# Patient Record
Sex: MALE | Race: BLACK OR AFRICAN AMERICAN | NOT HISPANIC OR LATINO | Employment: FULL TIME | URBAN - METROPOLITAN AREA
[De-identification: names, ages, dates, MRNs, and addresses within clinical notes are randomized per-mention and may not be internally consistent; named-entity substitution may affect disease eponyms.]

---

## 2017-08-15 ENCOUNTER — HOSPITAL ENCOUNTER (EMERGENCY)
Facility: HOSPITAL | Age: 37
Discharge: HOME | End: 2017-08-15
Attending: EMERGENCY MEDICINE
Payer: MEDICAID

## 2017-08-15 VITALS
RESPIRATION RATE: 15 BRPM | SYSTOLIC BLOOD PRESSURE: 130 MMHG | OXYGEN SATURATION: 99 % | BODY MASS INDEX: 23.32 KG/M2 | WEIGHT: 140 LBS | HEART RATE: 90 BPM | HEIGHT: 65 IN | DIASTOLIC BLOOD PRESSURE: 79 MMHG | TEMPERATURE: 98 F

## 2017-08-15 DIAGNOSIS — S39.012A LOW BACK STRAIN, INITIAL ENCOUNTER: Primary | ICD-10-CM

## 2017-08-15 DIAGNOSIS — Z18.9 RETAINED FOREIGN BODY: ICD-10-CM

## 2017-08-15 PROCEDURE — 99283 EMERGENCY DEPT VISIT LOW MDM: CPT

## 2017-08-15 PROCEDURE — 25000003 PHARM REV CODE 250: Performed by: EMERGENCY MEDICINE

## 2017-08-15 RX ORDER — CYCLOBENZAPRINE HCL 10 MG
10 TABLET ORAL 3 TIMES DAILY PRN
Qty: 15 TABLET | Refills: 0 | Status: SHIPPED | OUTPATIENT
Start: 2017-08-15 | End: 2017-08-20

## 2017-08-15 RX ORDER — KETOROLAC TROMETHAMINE 10 MG/1
10 TABLET, FILM COATED ORAL EVERY 8 HOURS
Qty: 10 TABLET | Refills: 0 | Status: SHIPPED | OUTPATIENT
Start: 2017-08-15 | End: 2017-08-15 | Stop reason: CLARIF

## 2017-08-15 RX ORDER — KETOROLAC TROMETHAMINE 10 MG/1
10 TABLET, FILM COATED ORAL
Status: COMPLETED | OUTPATIENT
Start: 2017-08-15 | End: 2017-08-15

## 2017-08-15 RX ORDER — LISINOPRIL 10 MG/1
10 TABLET ORAL DAILY
COMMUNITY

## 2017-08-15 RX ADMIN — KETOROLAC TROMETHAMINE 10 MG: 10 TABLET, FILM COATED ORAL at 07:08
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
## 2019-01-18 ENCOUNTER — HOSPITAL ENCOUNTER (EMERGENCY)
Facility: HOSPITAL | Age: 39
Discharge: HOME | End: 2019-01-18
Attending: EMERGENCY MEDICINE
Payer: MEDICAID

## 2019-01-18 VITALS
WEIGHT: 142 LBS | DIASTOLIC BLOOD PRESSURE: 95 MMHG | SYSTOLIC BLOOD PRESSURE: 160 MMHG | OXYGEN SATURATION: 95 % | RESPIRATION RATE: 16 BRPM | HEART RATE: 95 BPM | BODY MASS INDEX: 24.24 KG/M2 | TEMPERATURE: 98 F | HEIGHT: 64 IN

## 2019-01-18 DIAGNOSIS — Z86.59 HISTORY OF POSTTRAUMATIC STRESS DISORDER (PTSD): Primary | ICD-10-CM

## 2019-01-18 PROCEDURE — 99281 EMR DPT VST MAYX REQ PHY/QHP: CPT

## 2019-01-18 PROCEDURE — 25000003 PHARM REV CODE 250: Performed by: EMERGENCY MEDICINE

## 2019-01-18 RX ORDER — HYDROXYZINE PAMOATE 25 MG/1
25 CAPSULE ORAL
Status: DISCONTINUED | OUTPATIENT
Start: 2019-01-18 | End: 2019-01-18 | Stop reason: HOSPADM

## 2019-01-18 NOTE — ED TRIAGE NOTES
"Pt arrived via EMS Pt  went to two other hospitals [facility] for chest pain, Pt then called ems for third time saying that his ptsd is acting up, Pt does state that "I feel like I need to be hospitalized." Pt denies HI and SI     "
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
## 2019-01-18 NOTE — DISCHARGE INSTRUCTIONS
Please follow up with your regular doctor.  Please return for any new or worsening symptoms such as thoughts of harming herself, thoughts of harming others.

## 2022-07-26 ENCOUNTER — HOSPITAL ENCOUNTER (EMERGENCY)
Facility: HOSPITAL | Age: 42
Discharge: HOME | End: 2022-07-26
Attending: EMERGENCY MEDICINE
Payer: MEDICARE

## 2022-07-26 VITALS
BODY MASS INDEX: 25.61 KG/M2 | WEIGHT: 150 LBS | HEART RATE: 84 BPM | HEIGHT: 64 IN | RESPIRATION RATE: 18 BRPM | TEMPERATURE: 99 F | OXYGEN SATURATION: 98 % | DIASTOLIC BLOOD PRESSURE: 88 MMHG | SYSTOLIC BLOOD PRESSURE: 130 MMHG

## 2022-07-26 DIAGNOSIS — G89.4 CHRONIC PAIN SYNDROME: Primary | ICD-10-CM

## 2022-07-26 PROCEDURE — 96372 THER/PROPH/DIAG INJ SC/IM: CPT | Performed by: STUDENT IN AN ORGANIZED HEALTH CARE EDUCATION/TRAINING PROGRAM

## 2022-07-26 PROCEDURE — 99284 EMERGENCY DEPT VISIT MOD MDM: CPT

## 2022-07-26 PROCEDURE — 25000003 PHARM REV CODE 250: Performed by: STUDENT IN AN ORGANIZED HEALTH CARE EDUCATION/TRAINING PROGRAM

## 2022-07-26 PROCEDURE — 63600175 PHARM REV CODE 636 W HCPCS: Performed by: STUDENT IN AN ORGANIZED HEALTH CARE EDUCATION/TRAINING PROGRAM

## 2022-07-26 RX ORDER — HYDROCODONE BITARTRATE AND ACETAMINOPHEN 5; 325 MG/1; MG/1
1 TABLET ORAL
Status: COMPLETED | OUTPATIENT
Start: 2022-07-26 | End: 2022-07-26

## 2022-07-26 RX ORDER — KETOROLAC TROMETHAMINE 30 MG/ML
30 INJECTION, SOLUTION INTRAMUSCULAR; INTRAVENOUS
Status: COMPLETED | OUTPATIENT
Start: 2022-07-26 | End: 2022-07-26

## 2022-07-26 RX ADMIN — HYDROCODONE BITARTRATE AND ACETAMINOPHEN 1 TABLET: 5; 325 TABLET ORAL at 05:07

## 2022-07-26 RX ADMIN — KETOROLAC TROMETHAMINE 30 MG: 30 INJECTION, SOLUTION INTRAMUSCULAR; INTRAVENOUS at 05:07

## 2022-07-26 NOTE — DISCHARGE INSTRUCTIONS
Please follow-up as scheduled with your chronic pain management provider.  Please read all discharge instructions. Follow all written and verbal discharge instructions. Return to the nearest emergency room for any new or worsening symptoms, non-resolution of your current symptoms or if you feel you need reevaluation. Continue all home medications as prescribed and start any new prescriptions given to you in the emergency room. Followup with your primary care and make them aware of your recent ER visit. Followup with any additional providers or specialists as discussed. Do not hesitate to contact the emergency department if you have questions about your diagnosis, discharge instructions, followup recommendations, or medications.
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
## 2022-08-10 ENCOUNTER — HOSPITAL ENCOUNTER (OUTPATIENT)
Dept: RADIOLOGY | Facility: HOSPITAL | Age: 42
Discharge: HOME | End: 2022-08-10
Attending: PHYSICIAN ASSISTANT
Payer: MEDICARE

## 2022-08-10 ENCOUNTER — OFFICE VISIT (OUTPATIENT)
Dept: NEUROSURGERY | Facility: CLINIC | Age: 42
End: 2022-08-10
Payer: MEDICARE

## 2022-08-10 VITALS
BODY MASS INDEX: 25.6 KG/M2 | DIASTOLIC BLOOD PRESSURE: 86 MMHG | WEIGHT: 149.94 LBS | HEIGHT: 64 IN | HEART RATE: 103 BPM | SYSTOLIC BLOOD PRESSURE: 124 MMHG | TEMPERATURE: 98 F

## 2022-08-10 DIAGNOSIS — M54.9 DORSALGIA OF MULTIPLE SITES IN SPINE: ICD-10-CM

## 2022-08-10 DIAGNOSIS — M47.812 CERVICAL SPONDYLOSIS: Primary | ICD-10-CM

## 2022-08-10 DIAGNOSIS — M54.9 DORSALGIA: ICD-10-CM

## 2022-08-10 DIAGNOSIS — M54.9 DORSALGIA, UNSPECIFIED: ICD-10-CM

## 2022-08-10 DIAGNOSIS — M54.16 LUMBAR RADICULOPATHY, CHRONIC: ICD-10-CM

## 2022-08-10 DIAGNOSIS — M47.816 LUMBAR SPONDYLOSIS: ICD-10-CM

## 2022-08-10 DIAGNOSIS — M54.6 PAIN IN THORACIC SPINE: ICD-10-CM

## 2022-08-10 PROCEDURE — 99204 PR OFFICE/OUTPT VISIT, NEW, LEVL IV, 45-59 MIN: ICD-10-PCS | Mod: S$GLB,,, | Performed by: PHYSICIAN ASSISTANT

## 2022-08-10 PROCEDURE — 72114 X-RAY EXAM L-S SPINE BENDING: CPT | Mod: TC

## 2022-08-10 PROCEDURE — 72070 X-RAY EXAM THORAC SPINE 2VWS: CPT | Mod: 26,,, | Performed by: RADIOLOGY

## 2022-08-10 PROCEDURE — 3074F PR MOST RECENT SYSTOLIC BLOOD PRESSURE < 130 MM HG: ICD-10-PCS | Mod: CPTII,S$GLB,, | Performed by: PHYSICIAN ASSISTANT

## 2022-08-10 PROCEDURE — 72070 XR THORACIC SPINE AP LATERAL: ICD-10-PCS | Mod: 26,,, | Performed by: RADIOLOGY

## 2022-08-10 PROCEDURE — 3008F PR BODY MASS INDEX (BMI) DOCUMENTED: ICD-10-PCS | Mod: CPTII,S$GLB,, | Performed by: PHYSICIAN ASSISTANT

## 2022-08-10 PROCEDURE — 3074F SYST BP LT 130 MM HG: CPT | Mod: CPTII,S$GLB,, | Performed by: PHYSICIAN ASSISTANT

## 2022-08-10 PROCEDURE — 1159F PR MEDICATION LIST DOCUMENTED IN MEDICAL RECORD: ICD-10-PCS | Mod: CPTII,S$GLB,, | Performed by: PHYSICIAN ASSISTANT

## 2022-08-10 PROCEDURE — 99204 OFFICE O/P NEW MOD 45 MIN: CPT | Mod: S$GLB,,, | Performed by: PHYSICIAN ASSISTANT

## 2022-08-10 PROCEDURE — 3079F PR MOST RECENT DIASTOLIC BLOOD PRESSURE 80-89 MM HG: ICD-10-PCS | Mod: CPTII,S$GLB,, | Performed by: PHYSICIAN ASSISTANT

## 2022-08-10 PROCEDURE — 99999 PR PBB SHADOW E&M-EST. PATIENT-LVL V: ICD-10-PCS | Mod: PBBFAC,,, | Performed by: PHYSICIAN ASSISTANT

## 2022-08-10 PROCEDURE — 3008F BODY MASS INDEX DOCD: CPT | Mod: CPTII,S$GLB,, | Performed by: PHYSICIAN ASSISTANT

## 2022-08-10 PROCEDURE — 72114 X-RAY EXAM L-S SPINE BENDING: CPT | Mod: 26,,, | Performed by: RADIOLOGY

## 2022-08-10 PROCEDURE — 99999 PR PBB SHADOW E&M-EST. PATIENT-LVL V: CPT | Mod: PBBFAC,,, | Performed by: PHYSICIAN ASSISTANT

## 2022-08-10 PROCEDURE — 1159F MED LIST DOCD IN RCRD: CPT | Mod: CPTII,S$GLB,, | Performed by: PHYSICIAN ASSISTANT

## 2022-08-10 PROCEDURE — 3079F DIAST BP 80-89 MM HG: CPT | Mod: CPTII,S$GLB,, | Performed by: PHYSICIAN ASSISTANT

## 2022-08-10 PROCEDURE — 72070 X-RAY EXAM THORAC SPINE 2VWS: CPT | Mod: TC

## 2022-08-10 PROCEDURE — 72114 XR LUMBAR SPINE 5 VIEW WITH FLEX AND EXT: ICD-10-PCS | Mod: 26,,, | Performed by: RADIOLOGY

## 2022-08-10 RX ORDER — CYCLOBENZAPRINE HCL 10 MG
5-10 TABLET ORAL NIGHTLY
COMMUNITY
Start: 2022-05-24 | End: 2022-08-10

## 2022-08-10 RX ORDER — FAMOTIDINE 40 MG/1
40 TABLET, FILM COATED ORAL EVERY MORNING
COMMUNITY
Start: 2022-05-24

## 2022-08-10 RX ORDER — GABAPENTIN 300 MG/1
300 CAPSULE ORAL
COMMUNITY
End: 2022-08-10

## 2022-08-10 RX ORDER — TIZANIDINE 4 MG/1
TABLET ORAL
COMMUNITY
End: 2022-08-10

## 2022-08-10 RX ORDER — OXYCODONE AND ACETAMINOPHEN 10; 325 MG/1; MG/1
3 TABLET ORAL 3 TIMES DAILY PRN
COMMUNITY

## 2022-08-10 RX ORDER — ALBUTEROL SULFATE 90 UG/1
2 AEROSOL, METERED RESPIRATORY (INHALATION) EVERY 6 HOURS PRN
COMMUNITY
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
## 2022-08-22 DIAGNOSIS — M54.6 PAIN IN THORACIC SPINE: ICD-10-CM

## 2022-08-22 DIAGNOSIS — M47.816 LUMBAR SPONDYLOSIS: ICD-10-CM

## 2022-08-22 DIAGNOSIS — M47.812 CERVICAL SPONDYLOSIS: ICD-10-CM

## 2022-08-22 DIAGNOSIS — M54.16 LUMBAR RADICULOPATHY, CHRONIC: Primary | ICD-10-CM

## 2022-08-23 PROBLEM — M54.9: Status: ACTIVE | Noted: 2022-08-23

## 2022-08-24 ENCOUNTER — TELEPHONE (OUTPATIENT)
Dept: PAIN MEDICINE | Facility: CLINIC | Age: 42
End: 2022-08-24
Payer: MEDICARE

## 2022-08-24 NOTE — TELEPHONE ENCOUNTER
----- Message from Kaila Hickman sent at 8/24/2022  8:47 AM CDT -----  Regarding: CAll BAck  Name of Who is Calling:DANA BELTRÁN [[id number]]              What is the request in detail: Patient requesting a call back about Cancel appointment.08- Please assits              Can the clinic reply by [facility]: No              [facility]: [phone]

## 2022-08-24 NOTE — TELEPHONE ENCOUNTER
Staff spoke with patient regarding NP appt today, patients appt was canceled due to requesting multiple medications that we do not prescribe. Patient was referred to [facility] who should be able to assist further. Patient verbalized understanding.

## 2022-08-29 ENCOUNTER — TELEPHONE (OUTPATIENT)
Dept: NEUROSURGERY | Facility: CLINIC | Age: 42
End: 2022-08-29
Payer: MEDICARE

## 2022-08-29 NOTE — TELEPHONE ENCOUNTER
----- Message from Hali Benito PA-C sent at 8/10/2022  4:19 PM CDT -----  Can you schedule him for CT myelogram of the cervical, thoracic, and lumbar spine? I also sent a referral to pain management. We can follow-up with him after the myelogram is done.     Thanks!

## 2022-08-29 NOTE — TELEPHONE ENCOUNTER
Tania Rowland, RT  Shannan Richmond MA  Good afternoon-   This patient is refusing to schedule his myelogram.   Thought you should know.   Tania

## 2022-11-15 ENCOUNTER — HOSPITAL ENCOUNTER (EMERGENCY)
Facility: HOSPITAL | Age: 42
Discharge: LEFT BEFORE DISCHARGE | End: 2022-11-16
Attending: EMERGENCY MEDICINE
Payer: MEDICARE

## 2022-11-15 VITALS
BODY MASS INDEX: 25.61 KG/M2 | OXYGEN SATURATION: 98 % | HEART RATE: 76 BPM | DIASTOLIC BLOOD PRESSURE: 88 MMHG | TEMPERATURE: 98 F | SYSTOLIC BLOOD PRESSURE: 130 MMHG | WEIGHT: 150 LBS | HEIGHT: 64 IN | RESPIRATION RATE: 18 BRPM

## 2022-11-15 DIAGNOSIS — Z53.21 PATIENT LEFT WITHOUT BEING SEEN: Primary | ICD-10-CM

## 2022-11-15 LAB
INFLUENZA A, MOLECULAR: NEGATIVE
INFLUENZA B, MOLECULAR: NEGATIVE
SARS-COV-2 RDRP RESP QL NAA+PROBE: NEGATIVE
SPECIMEN SOURCE: NORMAL

## 2022-11-15 PROCEDURE — 99999 HC NO LEVEL OF SERVICE - ED ONLY: CPT

## 2022-11-15 PROCEDURE — U0002 COVID-19 LAB TEST NON-CDC: HCPCS | Performed by: NURSE PRACTITIONER

## 2022-11-15 PROCEDURE — 87502 INFLUENZA DNA AMP PROBE: CPT | Performed by: NURSE PRACTITIONER
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
## 2022-11-23 ENCOUNTER — OFFICE VISIT (OUTPATIENT)
Dept: UROLOGY | Facility: CLINIC | Age: 42
End: 2022-11-23
Payer: MEDICARE

## 2022-11-23 VITALS
BODY MASS INDEX: 26.14 KG/M2 | SYSTOLIC BLOOD PRESSURE: 146 MMHG | HEART RATE: 86 BPM | WEIGHT: 152.31 LBS | DIASTOLIC BLOOD PRESSURE: 91 MMHG

## 2022-11-23 DIAGNOSIS — Z87.448 H/O URETHRAL STRICTURE: Primary | ICD-10-CM

## 2022-11-23 LAB
BILIRUB SERPL-MCNC: NORMAL MG/DL
BLOOD URINE, POC: NORMAL
CLARITY, POC UA: CLEAR
COLOR, POC UA: YELLOW
GLUCOSE UR QL STRIP: NORMAL
KETONES UR QL STRIP: NORMAL
LEUKOCYTE ESTERASE URINE, POC: NORMAL
NITRITE, POC UA: NORMAL
PH, POC UA: 5
POC RESIDUAL URINE VOLUME: 0 ML (ref 0–100)
PROTEIN, POC: NORMAL
SPECIFIC GRAVITY, POC UA: 1.02
UROBILINOGEN, POC UA: NORMAL

## 2022-11-23 PROCEDURE — 81002 URINALYSIS NONAUTO W/O SCOPE: CPT | Mod: S$GLB,,,

## 2022-11-23 PROCEDURE — 81002 POCT URINE DIPSTICK WITHOUT MICROSCOPE: ICD-10-PCS | Mod: S$GLB,,,

## 2022-11-23 PROCEDURE — 3077F SYST BP >= 140 MM HG: CPT | Mod: CPTII,S$GLB,,

## 2022-11-23 PROCEDURE — 1159F PR MEDICATION LIST DOCUMENTED IN MEDICAL RECORD: ICD-10-PCS | Mod: CPTII,S$GLB,,

## 2022-11-23 PROCEDURE — 51798 US URINE CAPACITY MEASURE: CPT | Mod: S$GLB,,,

## 2022-11-23 PROCEDURE — 99999 PR PBB SHADOW E&M-EST. PATIENT-LVL III: CPT | Mod: PBBFAC,,,

## 2022-11-23 PROCEDURE — 3080F DIAST BP >= 90 MM HG: CPT | Mod: CPTII,S$GLB,,

## 2022-11-23 PROCEDURE — 99214 PR OFFICE/OUTPT VISIT, EST, LEVL IV, 30-39 MIN: ICD-10-PCS | Mod: S$GLB,,,

## 2022-11-23 PROCEDURE — 1160F PR REVIEW ALL MEDS BY PRESCRIBER/CLIN PHARMACIST DOCUMENTED: ICD-10-PCS | Mod: CPTII,S$GLB,,

## 2022-11-23 PROCEDURE — 1160F RVW MEDS BY RX/DR IN RCRD: CPT | Mod: CPTII,S$GLB,,

## 2022-11-23 PROCEDURE — 1159F MED LIST DOCD IN RCRD: CPT | Mod: CPTII,S$GLB,,

## 2022-11-23 PROCEDURE — 51798 POCT BLADDER SCAN: ICD-10-PCS | Mod: S$GLB,,,

## 2022-11-23 PROCEDURE — 3077F PR MOST RECENT SYSTOLIC BLOOD PRESSURE >= 140 MM HG: ICD-10-PCS | Mod: CPTII,S$GLB,,

## 2022-11-23 PROCEDURE — 99999 PR PBB SHADOW E&M-EST. PATIENT-LVL III: ICD-10-PCS | Mod: PBBFAC,,,

## 2022-11-23 PROCEDURE — 3008F BODY MASS INDEX DOCD: CPT | Mod: CPTII,S$GLB,,

## 2022-11-23 PROCEDURE — 99214 OFFICE O/P EST MOD 30 MIN: CPT | Mod: S$GLB,,,

## 2022-11-23 PROCEDURE — 3008F PR BODY MASS INDEX (BMI) DOCUMENTED: ICD-10-PCS | Mod: CPTII,S$GLB,,

## 2022-11-23 PROCEDURE — 3080F PR MOST RECENT DIASTOLIC BLOOD PRESSURE >= 90 MM HG: ICD-10-PCS | Mod: CPTII,S$GLB,,
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
## 2024-02-15 ENCOUNTER — HOSPITAL ENCOUNTER (EMERGENCY)
Facility: HOSPITAL | Age: 44
Discharge: HOME | End: 2024-02-15
Attending: EMERGENCY MEDICINE
Payer: MEDICARE

## 2024-02-15 VITALS
BODY MASS INDEX: 24.16 KG/M2 | OXYGEN SATURATION: 99 % | HEART RATE: 89 BPM | SYSTOLIC BLOOD PRESSURE: 139 MMHG | TEMPERATURE: 98 F | RESPIRATION RATE: 18 BRPM | WEIGHT: 145 LBS | DIASTOLIC BLOOD PRESSURE: 78 MMHG | HEIGHT: 65 IN

## 2024-02-15 DIAGNOSIS — G89.29 OTHER CHRONIC PAIN: ICD-10-CM

## 2024-02-15 DIAGNOSIS — R11.0 NAUSEA: ICD-10-CM

## 2024-02-15 DIAGNOSIS — R07.81 RIB PAIN ON LEFT SIDE: ICD-10-CM

## 2024-02-15 DIAGNOSIS — R10.9 LEFT FLANK PAIN: Primary | ICD-10-CM

## 2024-02-15 PROBLEM — G89.21 CHRONIC PAIN AFTER TRAUMATIC INJURY: Status: ACTIVE | Noted: 2022-09-20

## 2024-02-15 PROBLEM — J45.909 UNCOMPLICATED ASTHMA: Status: ACTIVE | Noted: 2019-01-03

## 2024-02-15 PROBLEM — M51.16 RADICULOPATHY DUE TO LUMBAR INTERVERTEBRAL DISC DISORDER: Status: ACTIVE | Noted: 2023-08-10

## 2024-02-15 PROBLEM — M47.817 LUMBOSACRAL SPONDYLOSIS WITHOUT MYELOPATHY: Status: ACTIVE | Noted: 2022-09-20

## 2024-02-15 PROBLEM — F14.20 COCAINE USE DISORDER, MODERATE, DEPENDENCE: Status: ACTIVE | Noted: 2024-02-15

## 2024-02-15 PROBLEM — F31.9: Status: ACTIVE | Noted: 2022-08-01

## 2024-02-15 PROBLEM — M54.12 CERVICAL RADICULOPATHY: Status: ACTIVE | Noted: 2023-08-10

## 2024-02-15 PROBLEM — F43.10 PTSD (POST-TRAUMATIC STRESS DISORDER): Status: ACTIVE | Noted: 2024-02-15

## 2024-02-15 PROBLEM — N35.013: Status: ACTIVE | Noted: 2023-10-16

## 2024-02-15 PROBLEM — M47.812 CERVICAL SPONDYLOSIS WITHOUT MYELOPATHY: Status: ACTIVE | Noted: 2022-09-20

## 2024-02-15 PROBLEM — F32.A DEPRESSION: Status: ACTIVE | Noted: 2019-01-03

## 2024-02-15 PROBLEM — N17.9 AKI (ACUTE KIDNEY INJURY): Status: ACTIVE | Noted: 2024-02-15

## 2024-02-15 PROBLEM — R39.9 LOWER URINARY TRACT SYMPTOMS: Status: ACTIVE | Noted: 2023-10-16

## 2024-02-15 LAB
BACTERIA #/AREA URNS HPF: NORMAL /HPF
BILIRUB UR QL STRIP: NEGATIVE
CLARITY UR: CLEAR
COLOR UR: COLORLESS
GLUCOSE UR QL STRIP: NEGATIVE
HGB UR QL STRIP: NEGATIVE
KETONES UR QL STRIP: NEGATIVE
LEUKOCYTE ESTERASE UR QL STRIP: ABNORMAL
MICROSCOPIC COMMENT: NORMAL
NITRITE UR QL STRIP: NEGATIVE
PH UR STRIP: 7 [PH] (ref 5–8)
PROT UR QL STRIP: NEGATIVE
SP GR UR STRIP: 1.01 (ref 1–1.03)
URN SPEC COLLECT METH UR: ABNORMAL
UROBILINOGEN UR STRIP-ACNC: NEGATIVE EU/DL
WBC #/AREA URNS HPF: 2 /HPF (ref 0–5)

## 2024-02-15 PROCEDURE — 99284 EMERGENCY DEPT VISIT MOD MDM: CPT | Mod: 25

## 2024-02-15 PROCEDURE — 81000 URINALYSIS NONAUTO W/SCOPE: CPT | Performed by: NURSE PRACTITIONER

## 2024-02-15 PROCEDURE — 96372 THER/PROPH/DIAG INJ SC/IM: CPT | Performed by: NURSE PRACTITIONER

## 2024-02-15 PROCEDURE — 63600175 PHARM REV CODE 636 W HCPCS: Performed by: NURSE PRACTITIONER

## 2024-02-15 RX ORDER — HYDROXYZINE PAMOATE 50 MG/1
50 CAPSULE ORAL 3 TIMES DAILY PRN
COMMUNITY

## 2024-02-15 RX ORDER — ONDANSETRON 4 MG/1
4 TABLET, ORALLY DISINTEGRATING ORAL EVERY 6 HOURS PRN
Qty: 20 TABLET | Refills: 0 | Status: SHIPPED | OUTPATIENT
Start: 2024-02-15

## 2024-02-15 RX ORDER — OXYCODONE HYDROCHLORIDE 10 MG/1
10 TABLET ORAL
COMMUNITY

## 2024-02-15 RX ORDER — NAPROXEN 500 MG/1
500 TABLET ORAL EVERY 12 HOURS PRN
Qty: 20 TABLET | Refills: 0 | Status: SHIPPED | OUTPATIENT
Start: 2024-02-15

## 2024-02-15 RX ORDER — IBUPROFEN 600 MG/1
TABLET ORAL
COMMUNITY

## 2024-02-15 RX ORDER — HYDROCODONE BITARTRATE AND ACETAMINOPHEN 5; 325 MG/1; MG/1
TABLET ORAL
COMMUNITY

## 2024-02-15 RX ORDER — TIZANIDINE 2 MG/1
2 TABLET ORAL EVERY 8 HOURS PRN
Qty: 15 TABLET | Refills: 0 | Status: SHIPPED | OUTPATIENT
Start: 2024-02-15

## 2024-02-15 RX ORDER — KETOROLAC TROMETHAMINE 30 MG/ML
30 INJECTION, SOLUTION INTRAMUSCULAR; INTRAVENOUS
Status: COMPLETED | OUTPATIENT
Start: 2024-02-15 | End: 2024-02-15

## 2024-02-15 RX ADMIN — KETOROLAC TROMETHAMINE 30 MG: 30 INJECTION, SOLUTION INTRAMUSCULAR; INTRAVENOUS at 04:02
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
## 2024-02-15 NOTE — DISCHARGE INSTRUCTIONS

## 2024-02-15 NOTE — Clinical Note
"Carmelaun "Fer" Hasmukh was seen and treated in our emergency department on 2/15/2024.  He may return to work on 02/17/2024.       If you have any questions or concerns, please don't hesitate to call.      Kenny Marie, NP"

## 2024-05-20 PROBLEM — N17.9 AKI (ACUTE KIDNEY INJURY): Status: RESOLVED | Noted: 2024-02-15 | Resolved: 2024-05-20

## 2024-07-14 ENCOUNTER — OFFICE VISIT (OUTPATIENT)
Dept: URGENT CARE | Facility: CLINIC | Age: 44
End: 2024-07-14
Payer: MEDICARE

## 2024-07-14 VITALS
DIASTOLIC BLOOD PRESSURE: 80 MMHG | OXYGEN SATURATION: 99 % | HEIGHT: 65 IN | BODY MASS INDEX: 24.17 KG/M2 | HEART RATE: 81 BPM | TEMPERATURE: 98 F | SYSTOLIC BLOOD PRESSURE: 131 MMHG | WEIGHT: 145.06 LBS | RESPIRATION RATE: 17 BRPM

## 2024-07-14 DIAGNOSIS — S30.812A ABRASION OF PENIS WITH INFECTION, INITIAL ENCOUNTER: Primary | ICD-10-CM

## 2024-07-14 DIAGNOSIS — N48.29 ABRASION OF PENIS WITH INFECTION, INITIAL ENCOUNTER: Primary | ICD-10-CM

## 2024-07-14 DIAGNOSIS — S30.812A ABRASION OF PENIS, INITIAL ENCOUNTER: ICD-10-CM

## 2024-07-14 PROCEDURE — 99213 OFFICE O/P EST LOW 20 MIN: CPT | Mod: S$GLB,,, | Performed by: FAMILY MEDICINE

## 2024-07-14 RX ORDER — MUPIROCIN 20 MG/G
OINTMENT TOPICAL 2 TIMES DAILY
Qty: 22 G | Refills: 0 | Status: SHIPPED | OUTPATIENT
Start: 2024-07-14

## 2024-07-14 RX ORDER — CEPHALEXIN 500 MG/1
500 CAPSULE ORAL EVERY 8 HOURS
Qty: 21 CAPSULE | Refills: 0 | Status: SHIPPED | OUTPATIENT
Start: 2024-07-14 | End: 2024-07-21

## 2024-07-14 RX ORDER — LOSARTAN POTASSIUM 25 MG/1
25 TABLET ORAL
COMMUNITY

## 2024-07-14 NOTE — PATIENT INSTRUCTIONS
Use a topical and oral antibiotic as prescribed.  You can also add Tylenol or Motrin as needed for discomfort.    I recommend following up with the Urology.  A referral has been placed. You can call our  line at [phone] and they can assist you in making this specialist appointment

## 2024-07-14 NOTE — PROGRESS NOTES
"Subjective:      Patient ID: Fer Noe is a 44 y.o. male.    Vitals:  height is 5' 4.75" (1.645 m) and weight is 65.8 kg (145 lb 1 oz). His oral temperature is 98.2 °F (36.8 °C). His blood pressure is 131/80 and his pulse is 81. His respiration is 17 and oxygen saturation is 99%.     Chief Complaint: Laceration    Pt presents with painful abrasion/skin tear superficial on his dorsal aspect of his penile shaft.  Pt is wondering if it's from pulling skin back from penis (uncircumcised) when urinating.  Symptoms have been occurring for a couple of days.. Pt attempted to tx area with triple antibiotic, but feels like its not healing because he keeps having to stretch the skin. Sx began a few days ago.  Pt is interested in adult circumcision and would like a referral to urology.  Denies any other urologic symptoms.  No fever reported.    Laceration       Skin:  Positive for abrasion.      Objective:     Physical Exam  Constitutional: Pt oriented to person, place, and time.  Non-toxic appearance.   Patient does not appear ill. No distress. normal  HENT: No icterus or facial swelling appreciated  Head: Normocephalic and atraumatic.   Nose: No congestion.   Pulmonary/Chest: Effort normal. No stridor. No respiratory distress.   Abdominal: Normal appearance. Abdomen exhibits no distension.   Musculoskeletal:         General: No swelling.   Neurological: no focal deficit. Patient is alert and oriented to person, place, and time.   Skin: transverse superficial skin tear/abrasion on the dorsal aspect of the mid to distal penile shaft.  No penile drainage.  No drainage from the abrasion.  No swelling or erythema surrounding.  Psychiatric: Patients behavior is normal. Mood, judgment and thought content normal.     Assessment:     1. Abrasion of penis with infection, initial encounter    2. Abrasion of penis, initial encounter        Plan:       Abrasion of penis with infection, initial encounter  -     Ambulatory " referral/consult to Urology  -     mupirocin (BACTROBAN) 2 % ointment; Apply topically 2 (two) times daily.  Dispense: 22 g; Refill: 0  -     cephALEXin (KEFLEX) 500 MG capsule; Take 1 capsule (500 mg total) by mouth every 8 (eight) hours. for 7 days  Dispense: 21 capsule; Refill: 0      Patient Instructions   Use a topical and oral antibiotic as prescribed.  You can also add Tylenol or Motrin as needed for discomfort.    I recommend following up with the Urology.  A referral has been placed. You can call our  line at [phone] and they can assist you in making this specialist appointment